# Patient Record
Sex: FEMALE | Race: WHITE | NOT HISPANIC OR LATINO | Employment: OTHER | URBAN - METROPOLITAN AREA
[De-identification: names, ages, dates, MRNs, and addresses within clinical notes are randomized per-mention and may not be internally consistent; named-entity substitution may affect disease eponyms.]

---

## 2017-10-12 NOTE — PATIENT DISCUSSION
Suggested OTC +1.25 for now for computer use and stronger +2.00 for reading closer.  If not working can go to computer PAL or CL's.

## 2020-08-04 ENCOUNTER — NEW PATIENT (OUTPATIENT)
Dept: URBAN - METROPOLITAN AREA CLINIC 26 | Facility: CLINIC | Age: 70
End: 2020-08-04

## 2020-08-04 VITALS
HEART RATE: 78 BPM | SYSTOLIC BLOOD PRESSURE: 114 MMHG | WEIGHT: 125 LBS | HEIGHT: 65 IN | BODY MASS INDEX: 20.83 KG/M2 | DIASTOLIC BLOOD PRESSURE: 77 MMHG

## 2020-08-04 DIAGNOSIS — H35.373: ICD-10-CM

## 2020-08-04 DIAGNOSIS — H02.833: ICD-10-CM

## 2020-08-04 DIAGNOSIS — H35.3131: ICD-10-CM

## 2020-08-04 DIAGNOSIS — H02.836: ICD-10-CM

## 2020-08-04 PROCEDURE — 92250 FUNDUS PHOTOGRAPHY W/I&R: CPT

## 2020-08-04 PROCEDURE — 92134 CPTRZ OPH DX IMG PST SGM RTA: CPT

## 2020-08-04 PROCEDURE — 92014 COMPRE OPH EXAM EST PT 1/>: CPT

## 2020-08-04 ASSESSMENT — VISUAL ACUITY
OS_CC: 20/20-2
OD_SC: 20/100
OS_SC: 20/25-2
OD_CC: 20/25

## 2020-08-04 ASSESSMENT — TONOMETRY
OD_IOP_MMHG: 18
OS_IOP_MMHG: 16

## 2020-08-13 ENCOUNTER — UNSCHEDULED FOLLOW UP (OUTPATIENT)
Dept: URBAN - METROPOLITAN AREA CLINIC 26 | Facility: CLINIC | Age: 70
End: 2020-08-13

## 2020-08-13 DIAGNOSIS — H35.373: ICD-10-CM

## 2020-08-13 DIAGNOSIS — H35.3131: ICD-10-CM

## 2020-08-13 DIAGNOSIS — G43.B0: ICD-10-CM

## 2020-08-13 PROCEDURE — 92134 CPTRZ OPH DX IMG PST SGM RTA: CPT

## 2020-08-13 PROCEDURE — 92012 INTRM OPH EXAM EST PATIENT: CPT

## 2020-08-13 ASSESSMENT — TONOMETRY
OS_IOP_MMHG: 16
OD_IOP_MMHG: 17

## 2020-08-13 ASSESSMENT — VISUAL ACUITY
OD_SC: 20/60+2
OS_SC: 20/25+1

## 2022-07-30 ENCOUNTER — TELEPHONE ENCOUNTER (OUTPATIENT)
Age: 72
End: 2022-07-30

## 2022-07-31 ENCOUNTER — TELEPHONE ENCOUNTER (OUTPATIENT)
Age: 72
End: 2022-07-31

## 2022-07-31 RX ORDER — SACCHAROMYCES BOULARDII 250 MG
1 CAPSULE ORAL
Qty: 0 | Refills: 5 | Status: ACTIVE | COMMUNITY
Start: 2013-01-10